# Patient Record
Sex: FEMALE | Race: BLACK OR AFRICAN AMERICAN
[De-identification: names, ages, dates, MRNs, and addresses within clinical notes are randomized per-mention and may not be internally consistent; named-entity substitution may affect disease eponyms.]

---

## 2022-09-19 ENCOUNTER — HOSPITAL ENCOUNTER (INPATIENT)
Dept: HOSPITAL 12 - ER | Age: 67
LOS: 11 days | Discharge: NURSING, REHAB OR LONG-TERM CARE | DRG: 885 | End: 2022-09-30
Attending: INTERNAL MEDICINE
Payer: MEDICARE

## 2022-09-19 VITALS — DIASTOLIC BLOOD PRESSURE: 90 MMHG | SYSTOLIC BLOOD PRESSURE: 134 MMHG

## 2022-09-19 VITALS — BODY MASS INDEX: 26.52 KG/M2 | WEIGHT: 165 LBS | HEIGHT: 66 IN

## 2022-09-19 VITALS — DIASTOLIC BLOOD PRESSURE: 94 MMHG | SYSTOLIC BLOOD PRESSURE: 136 MMHG

## 2022-09-19 DIAGNOSIS — E11.9: ICD-10-CM

## 2022-09-19 DIAGNOSIS — F25.0: Primary | ICD-10-CM

## 2022-09-19 DIAGNOSIS — I10: ICD-10-CM

## 2022-09-19 DIAGNOSIS — E87.6: ICD-10-CM

## 2022-09-19 DIAGNOSIS — Z20.822: ICD-10-CM

## 2022-09-19 DIAGNOSIS — E61.1: ICD-10-CM

## 2022-09-19 LAB
ALP SERPL-CCNC: 74 U/L (ref 50–136)
ALT SERPL W/O P-5'-P-CCNC: 23 U/L (ref 14–59)
APAP SERPL-MCNC: < 10 UG/ML (ref 10–30)
AST SERPL-CCNC: 21 U/L (ref 15–37)
BILIRUB DIRECT SERPL-MCNC: 0.1 MG/DL (ref 0–0.2)
BILIRUB SERPL-MCNC: 0.6 MG/DL (ref 0.2–1)
BUN SERPL-MCNC: 10 MG/DL (ref 7–18)
CHLORIDE SERPL-SCNC: 104 MMOL/L (ref 98–107)
CO2 SERPL-SCNC: 27 MMOL/L (ref 21–32)
CREAT SERPL-MCNC: 0.7 MG/DL (ref 0.6–1.3)
ETHANOL SERPL-MCNC: < 3 MG/DL (ref 0–0)
GLUCOSE SERPL-MCNC: 100 MG/DL (ref 74–106)
HCT VFR BLD AUTO: 35.3 % (ref 31.2–41.9)
MCH RBC QN AUTO: 25 UUG (ref 24.7–32.8)
MCV RBC AUTO: 75.1 FL (ref 75.5–95.3)
PLATELET # BLD AUTO: 344 K/UL (ref 179–408)
POTASSIUM SERPL-SCNC: 3.4 MMOL/L (ref 3.5–5.1)
WS STN SPEC: 8.1 G/DL (ref 6.4–8.2)

## 2022-09-19 PROCEDURE — A4663 DIALYSIS BLOOD PRESSURE CUFF: HCPCS

## 2022-09-19 PROCEDURE — G0480 DRUG TEST DEF 1-7 CLASSES: HCPCS

## 2022-09-19 NOTE — NUR
GPS: Nursing Notes: Admitting Notes: 

Patient is admitted to [facility] on 5150 DTO due to striking out to residents without provocation, 
on face to face assessment, patient is A/Ox3, uncooperative, paranoid behavior, denies 
SI/HI, denies AH/VH, but responding to internal stimuli by talking to unseen others, 
hyperverbal, grandiose, stated "I am a doctor... I am a psychologist... I am a ..", 
hyper-Taoist at times, calling staff "Carmelo..", loud and pressured speech, refusing to go 
to her room, unable to formulate a viable plan for self care, stated "The police lie about 
me..", oriented to the unit, admitting package given with Patient's Rights Handbook to 
patient, continue to monitor for safety, Dr. Dorsey and admitting internist informed of 
admission by charge nurse.

## 2022-09-20 VITALS — DIASTOLIC BLOOD PRESSURE: 97 MMHG | SYSTOLIC BLOOD PRESSURE: 122 MMHG

## 2022-09-20 VITALS — SYSTOLIC BLOOD PRESSURE: 151 MMHG | DIASTOLIC BLOOD PRESSURE: 79 MMHG

## 2022-09-20 LAB
ALP SERPL-CCNC: 78 U/L (ref 50–136)
ALT SERPL W/O P-5'-P-CCNC: 21 U/L (ref 14–59)
AST SERPL-CCNC: 13 U/L (ref 15–37)
BILIRUB SERPL-MCNC: 0.4 MG/DL (ref 0.2–1)
BUN SERPL-MCNC: 18 MG/DL (ref 7–18)
CHLORIDE SERPL-SCNC: 107 MMOL/L (ref 98–107)
CHOLEST SERPL-MCNC: 198 MG/DL (ref ?–200)
CO2 SERPL-SCNC: 28 MMOL/L (ref 21–32)
CREAT SERPL-MCNC: 0.9 MG/DL (ref 0.6–1.3)
GLUCOSE SERPL-MCNC: 109 MG/DL (ref 74–106)
HDLC SERPL-MCNC: 86 MG/DL (ref 40–60)
IRON SERPL-MCNC: 28 UG/DL (ref 50–175)
POTASSIUM SERPL-SCNC: 3.4 MMOL/L (ref 3.5–5.1)
TRIGL SERPL-MCNC: 69 MG/DL (ref 30–150)
TSH SERPL DL<=0.005 MIU/L-ACNC: 2.7 MIU/ML (ref 0.36–3.74)
WS STN SPEC: 8.5 G/DL (ref 6.4–8.2)

## 2022-09-20 RX ADMIN — DIVALPROEX SODIUM SCH MG: 500 TABLET, DELAYED RELEASE ORAL at 11:45

## 2022-09-20 RX ADMIN — DIVALPROEX SODIUM SCH MG: 500 TABLET, DELAYED RELEASE ORAL at 16:35

## 2022-09-20 RX ADMIN — OLANZAPINE SCH MG: 5 TABLET ORAL at 20:13

## 2022-09-20 NOTE — NUR
Firearms Report:



 completed and submitted a [employer] firearms report for 5150 a danger to others. A 
copy of report has been placed in patient chart.

## 2022-09-20 NOTE — NUR
ASYA Initial Discharge Note:



Pt currently resides at [address] [facility] [address] ([phone]). ASYA 
contacted the facility and the line was busy. ASYA will continue to contact the facility 
regarding pt's safe return upon discharge. ASYA maxi continue to work with pt and MD to ensure 
a safe and proper discharge plan.

## 2022-09-20 NOTE — NUR
ASYA Family Contact:



Pt does not have any available contact at this time. SW contacted [facility] 
([phone]) and was not able to be connected due to a busy line.

## 2022-09-20 NOTE — NUR
GPS: Nursing Notes: Thought Disorder:

Patient is awake and responding to her name, A/Ox3, continue to refuse her medications, poor 
appetite, hyperverbal, hyper-Anabaptist, responding to internal stimuli by talking to self, 
mumbling to self, grandiose, believes that she is a psychiatrist, a psychologist, and a 
, explained the pros or cons of medications, but continue to refuse her medications, 
Potassium level 3.4, but refused her loading dose, stated "My level will be okay when I eat 
my breakfast..", paranoid behavior, poor impulse control impaired judgment, unable to 
formulate a viable plan for self care, continue to monitor for safety, continue with 
treatment plan.

## 2022-09-20 NOTE — NUR
GPS NOTES: Received patient seating in the hallway, eating her dinner. Patient noted to be 
guarded when approached, she is being aggravated when attempt to engage in conversation. She 
is responding to internal stimuli, mumbling to herself and very paranoid. She did not sleep 
during shift, she was offered PRN to help her sleep but refused. She is observed to be 
keeping her dinner tray and gets mad when attempt to put it away from her. Patient is 
monitored for any escalation of behavior.

## 2022-09-21 RX ADMIN — FERROUS SULFATE TAB 325 MG (65 MG ELEMENTAL FE) SCH MG: 325 (65 FE) TAB at 10:00

## 2022-09-21 RX ADMIN — OLANZAPINE SCH MG: 5 TABLET ORAL at 21:00

## 2022-09-21 RX ADMIN — DOCUSATE SODIUM SCH MG: 100 CAPSULE, LIQUID FILLED ORAL at 21:00

## 2022-09-21 RX ADMIN — DIVALPROEX SODIUM SCH MG: 500 TABLET, DELAYED RELEASE ORAL at 08:19

## 2022-09-21 RX ADMIN — DIVALPROEX SODIUM SCH MG: 500 TABLET, DELAYED RELEASE ORAL at 17:00

## 2022-09-21 NOTE — NUR
Patient is labile, anxious, outburst at times, restless, refusing her medications, 
uncooperative, not following directions. A/O X 2 to person. Pt. keeps collecting food trays 
in her room, and gets mad when they are removed. Patient ambulates without assistance. Self 
care. Patient is encourage to verbalize concerns. Fall and safety precautions implemented.

## 2022-09-21 NOTE — NUR
GPS NOTES: Patient remains in her room, A&0x1, she is hyperverbal with flight of ideas 
thought process, patient unable to make meaning conversation, she is keeping things in her 
room priscila her meal trays, she is responding to internal stimuli. She can make her needs 
known,and calm when doing so. She is not sleeping, keeping herself occupied arranging things 
in her room. She refused medications, and prn medications. Frequent monitoring observed.

## 2022-09-21 NOTE — NUR
Patient became agitated, aggressive, striking out at others, combative, with high potential 
for physical harm to self and others. Dr. Dorsey ordered Zyprexa 10 mg IM. Security was 
called, 4 people were necessary to administer medication, but no force was applied or 
needed. Patient refuses vital signs. Patient will be monitored, fall and safety precautions 
implemented. Reassurance was given.

## 2022-09-21 NOTE — NUR
GPS: Received patient a/o x2 ambulatory. Patient is labile, anxious, outburst at this time. 
wiping the board and yelling at staff and patients, refused v/s,  restless, uncooperative, 
not following directions. Pt gets mad when staff redirect her. called  and 
received order for Zyprexa 10 mg im x1 now.  z IM given  without any problem and patient was 
cooperative.   Patient is encourage to verbalize concerns. but is uncooperative at this 
time. Fall and safety precautions implemented.

## 2022-09-21 NOTE — NUR
GPS: Received Patient awake ambulating in the [address].A/Ox3,  refused all po hs  
medications, poor appetite, hyperverbal, responding to internal stimuli by talking to self, 
mumbling to self, grandiose, believes that she is a psychiatrist, paranoid behavior, poor 
impulse control impaired judgment, unable to formulate a viable plan for self care, continue 
monitoring for safety.

## 2022-09-22 RX ADMIN — DIVALPROEX SODIUM SCH MG: 500 TABLET, DELAYED RELEASE ORAL at 08:27

## 2022-09-22 RX ADMIN — AMLODIPINE BESYLATE SCH MG: 5 TABLET ORAL at 08:27

## 2022-09-22 RX ADMIN — OLANZAPINE SCH MG: 5 TABLET ORAL at 20:19

## 2022-09-22 RX ADMIN — DOCUSATE SODIUM SCH MG: 100 CAPSULE, LIQUID FILLED ORAL at 20:19

## 2022-09-22 RX ADMIN — LORAZEPAM PRN MG: 1 TABLET ORAL at 03:15

## 2022-09-22 RX ADMIN — DIVALPROEX SODIUM SCH MG: 500 TABLET, DELAYED RELEASE ORAL at 17:00

## 2022-09-22 RX ADMIN — FERROUS SULFATE TAB 325 MG (65 MG ELEMENTAL FE) SCH MG: 325 (65 FE) TAB at 08:27

## 2022-09-22 NOTE — NUR
GPS:   Pt.refused V/S check earlier as well as bedtime meds. despite explanation of risks vs 
benefits x3. Pt.is hyperverbal,uncooperative,responding to internal stimuli, has tangential 
thoughts,flight of ideas and easily irritable when approached and when being re-directed. No 
striking out behavior noted. AWOL precautions observed. Will continue to monitor for any 
escalation of behavior.

## 2022-09-22 NOTE — NUR
Patient is refusing medications, reise was filed today. Patient intentionally wet the floor 
for someone to slid and fall. Patient is anxious, hyperverbal, argumentative, uncooperative 
with nursing care, psychotic, delusional "Stop being a coward and speak to me face to face". 
Active listening provided. Fall and safety precautions implemented.

## 2022-09-22 NOTE — NUR
GPS:Remain  Uncooperative with nursing care, occ talking to her self loud, alert to self 
only.  episodes of 

closing his room door and turnig the light off, isolative, sitting on her bed, slept 4.45 
hrs through the night.continue plan of care.

## 2022-09-23 RX ADMIN — AMLODIPINE BESYLATE SCH MG: 5 TABLET ORAL at 08:29

## 2022-09-23 RX ADMIN — DIVALPROEX SODIUM SCH MG: 500 TABLET, DELAYED RELEASE ORAL at 08:29

## 2022-09-23 RX ADMIN — DIVALPROEX SODIUM SCH MG: 125 CAPSULE ORAL at 20:13

## 2022-09-23 RX ADMIN — FERROUS SULFATE TAB 325 MG (65 MG ELEMENTAL FE) SCH MG: 325 (65 FE) TAB at 08:29

## 2022-09-23 RX ADMIN — DIVALPROEX SODIUM SCH MG: 125 CAPSULE ORAL at 17:00

## 2022-09-23 RX ADMIN — OLANZAPINE SCH MG: 5 TABLET ORAL at 20:16

## 2022-09-23 RX ADMIN — DOCUSATE SODIUM SCH MG: 100 CAPSULE, LIQUID FILLED ORAL at 20:13

## 2022-09-23 NOTE — NUR
Patient is selective and suspicious with medications, compliant with Risperdal with lots of 
prompts, paranoid about being poisoned, hyperverbal, anxious at times, internal stimuli, 
angry. A/O X 2 to person. Patient ambulates independently, self care. Active listening 
provided. Fall and safety precautions implemented.

## 2022-09-24 VITALS — SYSTOLIC BLOOD PRESSURE: 101 MMHG | DIASTOLIC BLOOD PRESSURE: 57 MMHG

## 2022-09-24 VITALS — SYSTOLIC BLOOD PRESSURE: 147 MMHG | DIASTOLIC BLOOD PRESSURE: 88 MMHG

## 2022-09-24 VITALS — DIASTOLIC BLOOD PRESSURE: 71 MMHG | SYSTOLIC BLOOD PRESSURE: 124 MMHG

## 2022-09-24 RX ADMIN — DIVALPROEX SODIUM SCH MG: 125 CAPSULE ORAL at 07:49

## 2022-09-24 RX ADMIN — DIVALPROEX SODIUM SCH MG: 125 CAPSULE ORAL at 23:18

## 2022-09-24 RX ADMIN — LORAZEPAM PRN MG: 1 TABLET ORAL at 07:24

## 2022-09-24 RX ADMIN — DIVALPROEX SODIUM SCH MG: 125 CAPSULE ORAL at 16:27

## 2022-09-24 RX ADMIN — DOCUSATE SODIUM SCH MG: 100 CAPSULE, LIQUID FILLED ORAL at 21:00

## 2022-09-24 RX ADMIN — OLANZAPINE SCH MG: 5 TABLET ORAL at 23:18

## 2022-09-24 RX ADMIN — AMLODIPINE BESYLATE SCH MG: 5 TABLET ORAL at 08:02

## 2022-09-24 RX ADMIN — FERROUS SULFATE TAB 325 MG (65 MG ELEMENTAL FE) SCH MG: 325 (65 FE) TAB at 07:49

## 2022-09-24 RX ADMIN — OLANZAPINE SCH MG: 5 TABLET ORAL at 21:00

## 2022-09-24 RX ADMIN — DIVALPROEX SODIUM SCH MG: 125 CAPSULE ORAL at 21:00

## 2022-09-24 RX ADMIN — Medication SCH ML: at 11:01

## 2022-09-24 NOTE — NUR
GPS:   Pt.woke up and staff re-offered her Depakote 250mg and Zyprexa 10mg PO which pt.took 
with little persuasion from staff. Pt.remains hyperverbal,hyper Protestant, easily irritable 
when being re-directed. Safety emphasized. Behavior monitoring continues.

## 2022-09-24 NOTE — NUR
GPS:   Dr. Gibson in the unit making his rounds at this time and instructed/ordered this 
writer to offer/give pt's meds.at this time. Pt.took her psychotropic meds.with little 
persuasion from staff. Will continue to monitor behavior.

## 2022-09-24 NOTE — NUR
GPS: Nursing Notes: Schizoeffictive: Patient experiences auditory hallucinations, has flight 
of ideas, and does not make sense with spoken to. Patient is hyperverbal, hyperreligious, 
and cannot formulate a plan of care. Easily becomes agitated and aggressive toward staff 
when entering her room, spoken to or given medications. Even with reassurance and education 
the patient is delirious. Verbally contracted for safety. Will continue to monitor for 
appropriate cognition abilities, saftey and compliance.

## 2022-09-25 VITALS — SYSTOLIC BLOOD PRESSURE: 113 MMHG | DIASTOLIC BLOOD PRESSURE: 74 MMHG

## 2022-09-25 VITALS — DIASTOLIC BLOOD PRESSURE: 72 MMHG | SYSTOLIC BLOOD PRESSURE: 112 MMHG

## 2022-09-25 VITALS — DIASTOLIC BLOOD PRESSURE: 69 MMHG | SYSTOLIC BLOOD PRESSURE: 111 MMHG

## 2022-09-25 RX ADMIN — OLANZAPINE SCH MG: 5 TABLET ORAL at 22:29

## 2022-09-25 RX ADMIN — FERROUS SULFATE TAB 325 MG (65 MG ELEMENTAL FE) SCH MG: 325 (65 FE) TAB at 08:26

## 2022-09-25 RX ADMIN — DOCUSATE SODIUM SCH MG: 100 CAPSULE, LIQUID FILLED ORAL at 22:29

## 2022-09-25 RX ADMIN — Medication SCH ML: at 08:26

## 2022-09-25 RX ADMIN — DIVALPROEX SODIUM SCH MG: 125 CAPSULE ORAL at 22:29

## 2022-09-25 RX ADMIN — DIVALPROEX SODIUM SCH MG: 125 CAPSULE ORAL at 08:25

## 2022-09-25 RX ADMIN — DIVALPROEX SODIUM SCH MG: 125 CAPSULE ORAL at 16:51

## 2022-09-25 RX ADMIN — AMLODIPINE BESYLATE SCH MG: 5 TABLET ORAL at 08:26

## 2022-09-25 NOTE — NUR
Patient is cooperative, isolative, withdrawn, selective with medications yet compliant. 
Patient is preoccupied about Court hearing tomorrow. Patient is aware that she could be 
reised. Patient is accusatory "Stop forcing me to do what you want" "I'll take those pills 
but stop talking. I had enough" Patient is self care. Pt. is encourage to vent feelings. 
Fall and safety precautions implemented.

## 2022-09-25 NOTE — NUR
GPS:   Pt.took all her bedtime meds.earlier without any problems. Less irritable,agitated 
but remains hyper-Islam,hyperverbal. Continues to talk to self. Re-directed prn. Safety 
emphasized. Will continue to monitor.

## 2022-09-26 VITALS — DIASTOLIC BLOOD PRESSURE: 60 MMHG | SYSTOLIC BLOOD PRESSURE: 98 MMHG

## 2022-09-26 VITALS — SYSTOLIC BLOOD PRESSURE: 143 MMHG | DIASTOLIC BLOOD PRESSURE: 93 MMHG

## 2022-09-26 VITALS — DIASTOLIC BLOOD PRESSURE: 49 MMHG | SYSTOLIC BLOOD PRESSURE: 90 MMHG

## 2022-09-26 RX ADMIN — DIVALPROEX SODIUM SCH MG: 125 CAPSULE ORAL at 20:53

## 2022-09-26 RX ADMIN — OLANZAPINE SCH MG: 5 TABLET ORAL at 20:53

## 2022-09-26 RX ADMIN — DOCUSATE SODIUM SCH MG: 100 CAPSULE, LIQUID FILLED ORAL at 20:53

## 2022-09-26 RX ADMIN — FERROUS SULFATE TAB 325 MG (65 MG ELEMENTAL FE) SCH MG: 325 (65 FE) TAB at 08:32

## 2022-09-26 RX ADMIN — DIVALPROEX SODIUM SCH MG: 125 CAPSULE ORAL at 08:31

## 2022-09-26 RX ADMIN — Medication SCH ML: at 08:32

## 2022-09-26 RX ADMIN — AMLODIPINE BESYLATE SCH MG: 5 TABLET ORAL at 08:32

## 2022-09-26 RX ADMIN — LORAZEPAM PRN MG: 1 TABLET ORAL at 20:53

## 2022-09-26 RX ADMIN — DIVALPROEX SODIUM SCH MG: 125 CAPSULE ORAL at 16:51

## 2022-09-26 NOTE — NUR
GPS:  Nursing Notes: Thought Disorder:

Patient is awake and responding to her name, impaired judgment, responding to internal 
stimuli by talking to unseen others, mumbling to self when walking on the hallway, gets 
easily irritable when redirected, loud and pressured speech when redirected, grandiose, 
believes that she is a psychologist and , believes that she is leaving today, stated 
"After, I talked to the .. I am leaving today..", "A woman needs to defend herself from 
any man..", poor anger management, poor impulse control, unable to formulate a viable plan 
for self care, poor appetite, continue to monitor for safety, continue with treatment plan.

## 2022-09-27 VITALS — SYSTOLIC BLOOD PRESSURE: 127 MMHG | DIASTOLIC BLOOD PRESSURE: 76 MMHG

## 2022-09-27 VITALS — DIASTOLIC BLOOD PRESSURE: 53 MMHG | SYSTOLIC BLOOD PRESSURE: 115 MMHG

## 2022-09-27 VITALS — SYSTOLIC BLOOD PRESSURE: 138 MMHG | DIASTOLIC BLOOD PRESSURE: 78 MMHG

## 2022-09-27 RX ADMIN — FERROUS SULFATE TAB 325 MG (65 MG ELEMENTAL FE) SCH MG: 325 (65 FE) TAB at 08:37

## 2022-09-27 RX ADMIN — AMLODIPINE BESYLATE SCH MG: 5 TABLET ORAL at 08:39

## 2022-09-27 RX ADMIN — OLANZAPINE SCH MG: 5 TABLET ORAL at 20:39

## 2022-09-27 RX ADMIN — DIVALPROEX SODIUM SCH MG: 125 CAPSULE ORAL at 16:38

## 2022-09-27 RX ADMIN — DIVALPROEX SODIUM SCH MG: 125 CAPSULE ORAL at 20:39

## 2022-09-27 RX ADMIN — LORAZEPAM PRN MG: 1 TABLET ORAL at 20:39

## 2022-09-27 RX ADMIN — DIVALPROEX SODIUM SCH MG: 125 CAPSULE ORAL at 08:36

## 2022-09-27 RX ADMIN — Medication SCH ML: at 08:39

## 2022-09-27 RX ADMIN — DOCUSATE SODIUM SCH MG: 100 CAPSULE, LIQUID FILLED ORAL at 20:39

## 2022-09-27 NOTE — NUR
GPS: Nursing Notes: Thought Disorder:

Patient is awake and responding to her name, impaired judgment, responding to internal 
stimuli by constantly talking to unseen others, internally preoccupied, poor appetite, 
believes that we are trying to poison her, hyper-Yazidi, hyperverbal, poor impulse 
control, poor anger management, loud and pressured speech, verbal abusive at times, 
threatening staff with a law cory, resistant with nursing care, episodes of talking 
incoherently, labile, unpredictable behavior, unable to formulate a viable plan for self 
care, continue to monitor for safety, continue with treatment plan.

## 2022-09-27 NOTE — NUR
Discharge Update:



Rj Malagon in  at [facility] ([phone]) where pt was 
brought from returned SW's call and confirmed that pt is welcome back upon discharge to 
their facility. Passion stated to ask for her when calling or Cornelia the Director of nursing. 
Pt does not have any family contact at this time.

## 2022-09-27 NOTE — NUR
Received patient laying in bed, talking to herself and clearly responding to internal 
stimuli. The patients tone was  angry and argumentative during that time. After a while , 
the patient was standing in her doorway  hyperverbal and rambling with delusions of grandeur 
such as " I am the barrios and everybody knows it. " Plus a lot of nonsensical Anglican 
references. This patient did however take her medications without any problems. The patient 
has been up and down all night so far but staying in her room mostly. Safety stratiges are 
in place and continuing to monitor for inappropriate outbursts , behavior escalation and 
medication compliance.

## 2022-09-28 VITALS — SYSTOLIC BLOOD PRESSURE: 142 MMHG | DIASTOLIC BLOOD PRESSURE: 85 MMHG

## 2022-09-28 VITALS — SYSTOLIC BLOOD PRESSURE: 146 MMHG | DIASTOLIC BLOOD PRESSURE: 85 MMHG

## 2022-09-28 VITALS — SYSTOLIC BLOOD PRESSURE: 99 MMHG | DIASTOLIC BLOOD PRESSURE: 66 MMHG

## 2022-09-28 LAB
BUN SERPL-MCNC: 16 MG/DL (ref 7–18)
CHLORIDE SERPL-SCNC: 108 MMOL/L (ref 98–107)
CO2 SERPL-SCNC: 29 MMOL/L (ref 21–32)
CREAT SERPL-MCNC: 0.8 MG/DL (ref 0.6–1.3)
GLUCOSE SERPL-MCNC: 95 MG/DL (ref 74–106)
MAGNESIUM SERPL-MCNC: 1.9 MG/DL (ref 1.8–2.4)
POTASSIUM SERPL-SCNC: 4 MMOL/L (ref 3.5–5.1)

## 2022-09-28 RX ADMIN — Medication SCH ML: at 08:38

## 2022-09-28 RX ADMIN — DIVALPROEX SODIUM SCH MG: 125 CAPSULE ORAL at 20:29

## 2022-09-28 RX ADMIN — DIVALPROEX SODIUM SCH MG: 125 CAPSULE ORAL at 16:50

## 2022-09-28 RX ADMIN — OLANZAPINE SCH MG: 5 TABLET ORAL at 20:29

## 2022-09-28 RX ADMIN — DIVALPROEX SODIUM SCH MG: 125 CAPSULE ORAL at 08:37

## 2022-09-28 RX ADMIN — DOCUSATE SODIUM SCH MG: 100 CAPSULE, LIQUID FILLED ORAL at 20:29

## 2022-09-28 RX ADMIN — AMLODIPINE BESYLATE SCH MG: 5 TABLET ORAL at 08:37

## 2022-09-28 RX ADMIN — FERROUS SULFATE TAB 325 MG (65 MG ELEMENTAL FE) SCH MG: 325 (65 FE) TAB at 08:37

## 2022-09-28 NOTE — NUR
Patient is isolative, talkative, grandiose delusional. Pt. 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 
mad at me? I want what is best for them!" Patient is cooperative, complaint with 
medications. A/O X 2 to person, place. Active listening provided. Fall and safety 
precautions implemented.

## 2022-09-28 NOTE — NUR
The patient has been up and down during the night. Tangental speech , grandiose delusions 
and labile moods. This writer is unable to engage in any meaningful or normal conversation 
with the patient. The patient can not stay on topic or answer question appropriately. 
However,  this patient has been medication compliant. It is clear that she is responding to 
internal stimuli frequently. And although she appears calmer then previous nights, the 
patients speech is edgy, borderline threatening, and behavior can be unpredictable. Safety 
Stratiges are in place and continuing to monitor for any behavior escalation or distress.

## 2022-09-28 NOTE — NUR
RECEIVED PATIENT IN HER ROOM. SHE IS NOTED A/O X 2. SHE IS LABILE, TANGENTAL SPEECH, EASILY 
IRRITABLE. WHEN ASKED WHY SHE WAS ADMITTED TO [facility] AND HOW SHE WAS FEELINGS, SHE RESPONDED, 
"I HAVE A FRIEND WHO LOOKS LIKE YOU". PATIENT HAS POOR INSIGHT INTO HER ADMISSION TO [facility]. NO 
AGGRESSIVE OR COMBATIVE BEHAVIOR NOTED AT THIS TIME. PATIENT IS COMPLIANT WITH HER 
MEDICATION REGIMENT. SHE IS REASSURED FOR HER SAFETY. SAFETY AND FALL PRECAUTIONS ARE IN 
PLACE. SHE WAS GIVEN PO FLUIDS AND SNACKS. WILL CONTINUE TO MONITOR.

## 2022-09-29 VITALS — SYSTOLIC BLOOD PRESSURE: 149 MMHG | DIASTOLIC BLOOD PRESSURE: 91 MMHG

## 2022-09-29 VITALS — DIASTOLIC BLOOD PRESSURE: 65 MMHG | SYSTOLIC BLOOD PRESSURE: 120 MMHG

## 2022-09-29 VITALS — SYSTOLIC BLOOD PRESSURE: 126 MMHG | DIASTOLIC BLOOD PRESSURE: 78 MMHG

## 2022-09-29 RX ADMIN — FERROUS SULFATE TAB 325 MG (65 MG ELEMENTAL FE) SCH MG: 325 (65 FE) TAB at 09:22

## 2022-09-29 RX ADMIN — OLANZAPINE SCH MG: 5 TABLET ORAL at 20:18

## 2022-09-29 RX ADMIN — DIVALPROEX SODIUM SCH MG: 125 CAPSULE ORAL at 20:18

## 2022-09-29 RX ADMIN — AMLODIPINE BESYLATE SCH MG: 5 TABLET ORAL at 09:23

## 2022-09-29 RX ADMIN — DIVALPROEX SODIUM SCH MG: 125 CAPSULE ORAL at 16:35

## 2022-09-29 RX ADMIN — Medication SCH ML: at 09:24

## 2022-09-29 RX ADMIN — DOCUSATE SODIUM SCH MG: 100 CAPSULE, LIQUID FILLED ORAL at 20:18

## 2022-09-29 RX ADMIN — DIVALPROEX SODIUM SCH MG: 125 CAPSULE ORAL at 09:22

## 2022-09-29 NOTE — NUR
RECEIVED PATIENT IN HER ROOM SITTING IN HER BED. SHE IS NOTED A/O X 2. SHE APPEARS TO BE 
RESPONDING TO INTERNAL STIMULI. HER SPEECH IS TANGENTAL WITH A WORD SALAD AND GRANDIOSE 
DELUSIONS. 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 
[place], [place], [place] WHERE. YOU ARE DISRESPECTFUL. I AM LEAVING TOMORROW. I WANT MY 
MEDICATIONS NOW BEFORE I GO TO SLEEP". PATIENT IS NONSENSICAL AT TIMES. NO AGGRESSIVE NO 
COMBATIVE BX NOTED AT THIS TIME. PATIENT IS ABLE TO COMPLY WITH HER MEDICATION REGIMENT 
EXCEPT FOR COLACE, SHE STATED, "I DON'T HAVE A PROBLEM POOPING". PATIENT IS REASSURED FOR 
HER SAFETY. SAFETY AND FALL PRECAUTIONS ARE IN PLACE, PATIENT WAS GIVEN PO FLUIDS AND 
SNACKS.V/S STABLE. WILL CONTINUE TO MONITOR.

## 2022-09-29 NOTE — NUR
Gps/Lvn- Loud, talking to herself, tends to closed her door, discouraged from doing so. 
Pacing around, digging into the trash , gets loud and irritable when being redirected . 
Rudeness noted, calling staff names.

## 2022-09-30 VITALS — SYSTOLIC BLOOD PRESSURE: 136 MMHG | DIASTOLIC BLOOD PRESSURE: 90 MMHG

## 2022-09-30 RX ADMIN — DIVALPROEX SODIUM SCH MG: 125 CAPSULE ORAL at 08:28

## 2022-09-30 RX ADMIN — Medication SCH ML: at 08:33

## 2022-09-30 RX ADMIN — AMLODIPINE BESYLATE SCH MG: 5 TABLET ORAL at 08:29

## 2022-09-30 RX ADMIN — FERROUS SULFATE TAB 325 MG (65 MG ELEMENTAL FE) SCH MG: 325 (65 FE) TAB at 08:29

## 2022-09-30 NOTE — NUR
ASYA Discharge Note:



Pt will be discharged to [facility] located at [address] [place] [id number] 
([phone]) via Ambulance transportation at 11AM. ASYA spoke with admin coordinator, 
Dianna and Passion in admissions ([phone]) at the facility who state they are ready to 
accept the patient today. Pt is aware and agreeable with discharge plan. Pt does not have 
any family contact at this time. Pt is alert and oriented x1, is unable to plan for 
self-care at this time. However, pt is willing to accept care at SNF. Pt denies any suicidal 
or homicidal ideation. Pt will follow-up at the facility with her Psychiatrist, Dr. Joce Arriaga ([phone]), Psychologist, Dr. TENA ([phone]), and Internist, Dr. Naldo Manrique ([phone]). Pt presents with calm mood and congruent affect. PHARMACY: [facility] 
[phone].

## 2022-09-30 NOTE — NUR
patient slept for approx 2hrs through the night. She was offered Temazepam 7.5mg PO PRN for 
insomnia but she declined. She had a shower at 0400. no aggressive or combative bx noted 
during the shift. she continue talking to herself. work salad. will continue to monitor.

## 2022-09-30 NOTE — NUR
Gps/Lvn- Called [facility] report was given to Nancie Willson, requesting records of 
medications , was faxed as requested. All valuables/jewelries given back to patient.Patient 
was well informed of her discharged plan.

## 2022-09-30 NOTE — NUR
patient slept for approx 2hrs through the night. She was offered Temazepam 7.5mg PO PRN for 
insomnia but she declined. She had a shower at 0400. no aggressive or combative bx noted 
during the shift. will continue to monitor.

## 2022-09-30 NOTE — NUR
Gps/Lvn- Discharged to [facility] via ambulance, patient in no distress, no complaints 
noted, no S.I. no H.I. in good spirirt